# Patient Record
Sex: FEMALE | NOT HISPANIC OR LATINO | URBAN - METROPOLITAN AREA
[De-identification: names, ages, dates, MRNs, and addresses within clinical notes are randomized per-mention and may not be internally consistent; named-entity substitution may affect disease eponyms.]

---

## 2020-12-14 ENCOUNTER — IMPORTED ENCOUNTER (OUTPATIENT)
Dept: URBAN - METROPOLITAN AREA CLINIC 1 | Facility: CLINIC | Age: 40
End: 2020-12-14

## 2020-12-14 PROBLEM — H52.11: Noted: 2020-12-14

## 2020-12-14 PROBLEM — H52.03: Noted: 2020-12-14

## 2020-12-14 PROBLEM — H52.4: Noted: 2020-12-14

## 2020-12-14 PROBLEM — H52.223: Noted: 2020-12-14

## 2020-12-14 PROCEDURE — S0620 ROUTINE OPHTHALMOLOGICAL EXA: HCPCS

## 2020-12-14 NOTE — PATIENT DISCUSSION
1. Myopia- Rx was given for correction if indicated and requested. 2. Astigmatism OU3. PresbyopiaReturn for an appointment in 1 yr 36 with Dr. Margi Hankins.

## 2022-04-02 ASSESSMENT — VISUAL ACUITY
OD_CC: J1+
OS_CC: J1+
OD_CC: 20/20
OS_CC: 20/20-1

## 2022-04-02 ASSESSMENT — TONOMETRY
OS_IOP_MMHG: 14
OD_IOP_MMHG: 14